# Patient Record
Sex: MALE | URBAN - METROPOLITAN AREA
[De-identification: names, ages, dates, MRNs, and addresses within clinical notes are randomized per-mention and may not be internally consistent; named-entity substitution may affect disease eponyms.]

---

## 2024-10-01 ENCOUNTER — APPOINTMENT (OUTPATIENT)
Dept: URBAN - METROPOLITAN AREA CLINIC 186 | Age: 69
Setting detail: DERMATOLOGY
End: 2024-10-01

## 2024-10-01 DIAGNOSIS — L85.3 XEROSIS CUTIS: ICD-10-CM

## 2024-10-01 PROCEDURE — OTHER ADDITIONAL NOTES: OTHER

## 2024-10-01 PROCEDURE — OTHER COUNSELING: OTHER

## 2024-10-01 PROCEDURE — 99202 OFFICE O/P NEW SF 15 MIN: CPT

## 2024-10-01 ASSESSMENT — LOCATION DETAILED DESCRIPTION DERM: LOCATION DETAILED: INFERIOR THORACIC SPINE

## 2024-10-01 ASSESSMENT — LOCATION ZONE DERM: LOCATION ZONE: TRUNK

## 2024-10-01 ASSESSMENT — LOCATION SIMPLE DESCRIPTION DERM: LOCATION SIMPLE: UPPER BACK

## 2024-10-01 NOTE — PROCEDURE: ADDITIONAL NOTES
Render Risk Assessment In Note?: no
Additional Notes: Patient describes a feeling of wet skin, as if his hands and feet are submerged in water and when he rubs his skin with his hands it “feels wet”. Upon today’s exam skin appears and feels dry correlating with an impression of xerosis. Reassurance provided. Patient also reports not sleeping more than 4 hrs per night because of his symptoms for past 7 months?Sleep study or trial of short term medication may be considered. Recommend patient return to his pcp or endocrinologist for further evaluation. May try taking supplemental fish oil, SAMe 400mg, or N-Acetyl-Cysteine 400mg and see what happens.
Detail Level: Simple
Additional Notes: Upon examination, the patient skin feels and looks dry and scaly, particularly the lower legs. He states that his hands feel wet, but they are dry to my examination. I discussed with the patient openly and honestly that I certainly I am not able to arrive at a diagnosis. I wonder about his perception that he has which is opposite of what’s actually present. I suggested supplementation that may be helpful and to give each supplement a 30 day trial and see what happens.

## 2024-10-01 NOTE — HPI: OTHER
Condition:: Oily skin
Please Describe Your Condition:: Patient states he gets really hot on the inside and becomes very oily. He states he feel’s almost like a wet feeling, off and on.\\nThis has been going on since January. All over the body \\nHas had blood work done, has been to endocrinologist and there is no diagnosis.  did state this could be anxiety .

## 2025-04-09 ENCOUNTER — APPOINTMENT (OUTPATIENT)
Dept: URBAN - METROPOLITAN AREA SURGERY 9 | Age: 70
Setting detail: DERMATOLOGY
End: 2025-04-09

## 2025-04-09 DIAGNOSIS — E34.00 CARCINOID SYNDROME, UNSPECIFIED: ICD-10-CM

## 2025-04-09 PROBLEM — L30.9 DERMATITIS, UNSPECIFIED: Status: ACTIVE | Noted: 2025-04-09

## 2025-04-09 PROCEDURE — OTHER MIPS QUALITY: OTHER

## 2025-04-09 PROCEDURE — OTHER OTHER: OTHER

## 2025-04-09 PROCEDURE — OTHER ORDER TESTS: OTHER

## 2025-04-09 PROCEDURE — OTHER COUNSELING: OTHER

## 2025-04-09 PROCEDURE — OTHER TREATMENT REGIMEN: OTHER

## 2025-04-09 PROCEDURE — 99213 OFFICE O/P EST LOW 20 MIN: CPT

## 2025-04-09 NOTE — PROCEDURE: ORDER TESTS
Performing Laboratory: 0
Billing Type: Third-Party Bill
Bill For Surgical Tray: no
Expected Date Of Service: 04/09/2025

## 2025-04-09 NOTE — PROCEDURE: OTHER
Note Text (......Xxx Chief Complaint.): This diagnosis correlates with the
Other (Free Text): Has had  extensive workup previously including endocrinology studies. If  urine 5A-HIAA result wnl, may request endocrinology to pursue pheochromocytoma work up. Multitude  of symptoms that seem  out  of proportion to exam suggest an underlying medical condition that is not a primary dermatology condition.
Render Risk Assessment In Note?: no
Detail Level: Detailed